# Patient Record
Sex: MALE | URBAN - METROPOLITAN AREA
[De-identification: names, ages, dates, MRNs, and addresses within clinical notes are randomized per-mention and may not be internally consistent; named-entity substitution may affect disease eponyms.]

---

## 2017-05-03 ENCOUNTER — TELEPHONE (OUTPATIENT)
Dept: DERMATOLOGY | Facility: CLINIC | Age: 27
End: 2017-05-03

## 2017-05-03 NOTE — TELEPHONE ENCOUNTER
I called Miller to try and reschedule his t-cell apointment with  Dr. Bernard to Dr. Olivera for 6/26/17 @ 8:00 but he did not answer. I left a VM to call us back.

## 2017-05-03 NOTE — TELEPHONE ENCOUNTER
----- Message from Herminia DURANT Garrison sent at 5/1/2017  6:41 PM CDT -----  Regarding: FW: T- Cell Clinic Questions      ----- Message -----     From: Chioma Knight     Sent: 5/1/2017   5:30 PM       To: Derm Triage-[facility]  Subject: T- Cell Clinic Questions                         Hey you amber,    I just got off the phone with this patient who is wanting to come in for cutaneous t cell lymphoma. Per our guide if Dr. Joselin Bhat is booking out past 30 days to schedule in general derm and just make a note of that in the appointment notes. I informed the patient of this, but he is concerned that he is not seeing a specialist as he currently sees a general dermatology doctor. I told him that I could let you guys know he is concerned- do you have any talking points for me? Or if the patient is wanting to wait until the end of July for Joselin Bhat- are they OK with that, or would they rather the patient just get in to the clinic?    Thanks!  Chioma  [facility] Referrals

## 2017-06-30 ENCOUNTER — TRANSFERRED RECORDS (OUTPATIENT)
Dept: HEALTH INFORMATION MANAGEMENT | Facility: CLINIC | Age: 27
End: 2017-06-30

## 2017-07-10 ENCOUNTER — OFFICE VISIT (OUTPATIENT)
Dept: DERMATOLOGY | Facility: CLINIC | Age: 27
End: 2017-07-10

## 2017-07-10 VITALS — DIASTOLIC BLOOD PRESSURE: 77 MMHG | HEART RATE: 80 BPM | SYSTOLIC BLOOD PRESSURE: 134 MMHG

## 2017-07-10 DIAGNOSIS — C84.A0 CUTANEOUS T-CELL LYMPHOMA, UNSPECIFIED BODY REGION (H): ICD-10-CM

## 2017-07-10 DIAGNOSIS — C84.A0 CUTANEOUS T-CELL LYMPHOMA, UNSPECIFIED BODY REGION (H): Primary | ICD-10-CM

## 2017-07-10 LAB
BASOPHILS # BLD AUTO: 0 10E9/L (ref 0–0.2)
BASOPHILS NFR BLD AUTO: 0.5 %
DIFFERENTIAL METHOD BLD: NORMAL
EOSINOPHIL # BLD AUTO: 0.3 10E9/L (ref 0–0.7)
EOSINOPHIL NFR BLD AUTO: 5 %
ERYTHROCYTE [DISTWIDTH] IN BLOOD BY AUTOMATED COUNT: 12 % (ref 10–15)
HCT VFR BLD AUTO: 42.2 % (ref 40–53)
HGB BLD-MCNC: 14.2 G/DL (ref 13.3–17.7)
IMM GRANULOCYTES # BLD: 0 10E9/L (ref 0–0.4)
IMM GRANULOCYTES NFR BLD: 0.2 %
LYMPHOCYTES # BLD AUTO: 1.6 10E9/L (ref 0.8–5.3)
LYMPHOCYTES NFR BLD AUTO: 29.2 %
MCH RBC QN AUTO: 30.9 PG (ref 26.5–33)
MCHC RBC AUTO-ENTMCNC: 33.6 G/DL (ref 31.5–36.5)
MCV RBC AUTO: 92 FL (ref 78–100)
MONOCYTES # BLD AUTO: 0.4 10E9/L (ref 0–1.3)
MONOCYTES NFR BLD AUTO: 6.8 %
NEUTROPHILS # BLD AUTO: 3.3 10E9/L (ref 1.6–8.3)
NEUTROPHILS NFR BLD AUTO: 58.3 %
NRBC # BLD AUTO: 0 10*3/UL
NRBC BLD AUTO-RTO: 0 /100
PLATELET # BLD AUTO: 251 10E9/L (ref 150–450)
RBC # BLD AUTO: 4.59 10E12/L (ref 4.4–5.9)
RETICS # AUTO: 56.5 10E9/L (ref 25–95)
RETICS/RBC NFR AUTO: 1.2 % (ref 0.5–2)
WBC # BLD AUTO: 5.6 10E9/L (ref 4–11)

## 2017-07-10 RX ORDER — DESOXIMETASONE 2.5 MG/G
CREAM TOPICAL
COMMUNITY
Start: 2015-07-07

## 2017-07-10 RX ORDER — CLOBETASOL PROPIONATE 0.5 MG/ML
SOLUTION TOPICAL
COMMUNITY
Start: 2016-08-12

## 2017-07-10 ASSESSMENT — PAIN SCALES - GENERAL
PAINLEVEL: NO PAIN (0)
PAINLEVEL: NO PAIN (0)

## 2017-07-10 NOTE — PATIENT INSTRUCTIONS

## 2017-07-10 NOTE — LETTER
7/10/2017       RE: Miller Maldonado  [address] [place] [id number]     Dear Colleague,    Thank you for referring your patient, Miller Maldonado, to the [facility] DERMATOLOGY at [facility]. Please see a copy of my visit note below.    [facility] Dermatology Note      Dermatology Problem List:  1. Cutaneous T-cell lymphoma, folliculotropic mycoses fungoides: Patient has been seen by Dr. Dm Santamaria with [facility] and has been undergoing NBUVB Light treatment and Excimer Breezy treatment since November 2015. Has had >231 treatments. Patient has also used betamethasone 0.05% ointment to the face and clobetasol 0.05% solution to non-face affected  areas.     Encounter Date: Jul 10, 2017    CC:   Chief Complaint   Patient presents with     Derm Problem     Miller comes to clinic today for CTCL.         History of Present Illness:  Mr. Miller Maldonado is a 27 year old male with PMH of eczema since age 13 and Cutaneous T-Cell Lymphoma, Folliculotropic Mycoses Fungoides who has been treated with NBUVB light treatment and Excimer Laser treatment since his diagnosis in November 2015. Patient has been followed by Dr. Dm Santamaria with the [facility]. He states that he has not really seen much change in his CTCL since starting his treatments and presents as a new patient to Dermatology clinic today basically for another opinion. Patient reports that he first noticed his skin changes back in October/November 2015. These started as hardened, red areas of skin. They did not itch, burn, or otherwise cause pain and he states that the lesions themselves are only symptomatic after he has received light therapy. He has been receiving Excimer Light treatment on lesions on his right cheek, right eyebrow, nose, chin, and right neck. Patient denies any fevers, chills, fatigue, recent unexplained weight loss.       Past Medical History:   There is no problem list on  file for this patient.    History reviewed. No pertinent past medical history.  History reviewed. No pertinent surgical history.    Social History:  The patient works as a . He is  and lives in [place], [place].      Family History:  Mother has history of melanoma.     Medications:  Current Outpatient Prescriptions   Medication Sig Dispense Refill     clobetasol (TEMOVATE) 0.05 % external solution        desoximetasone (TOPICORT) 0.25 % cream           No Known Allergies      Review of Systems:  -Constitutional: The patient denies fatigue, fevers, chills, unintended weight loss, and night sweats.  -HEENT: Patient denies nonhealing oral sores.  -Skin: As above in HPI. No additional skin concerns.    Physical exam:  Vitals: /77  Pulse 80  GEN: This is a well developed, well-nourished male in no acute distress, in a pleasant mood.  He presents to clinic with his wife.  SKIN: Total skin excluding the undergarment areas was performed. The exam included the head/face, neck, both arms, chest, back, abdomen, both legs, digits and/or nails.   -Several erythematous, indurated plaques located on chest, back, arms, legs.  -Dry, flaky skin with several small erosions located on back.   -No other lesions of concern on areas examined.   - No palpable nodes    Impression/Plan:  1. Cutaneous T-Cell Lymphoma: stable with NBUVB and Excimer Laser treatments 3 x weekly. Last lab work was done in November 2015.     Patient to continue with current treatments with Dr. Santamaria at [facility] in [place], [place]    Will re-stage patient today with following labs:    CBC with diff    Peripheral smear    Lymphoma Leukemia profile    TCR rearrangements of blood and tissue    Punch biopsy x 3, 2 x formalin (left mid forearm and left upper back over scapula), 1 x tissue TCR rearrangment (left mid forearm)  Punch biopsy:  After discussion of benefits and risks including but not limited to bleeding/bruising, pain/swelling,  infection, scar, incomplete removal, nerve damage/numbness, recurrence, and non-diagnostic biopsy, written consent, verbal consent and photographs were obtained. Time-out was performed. The area was cleaned with isopropyl alcohol. 2 mL of 1% lidocaine with epinephrine was injected to obtain adequate anesthesia of the lesion on the left mid forearm and left upper back over scapula. A 4 mm punch biopsy was performed.  4-0 prolene sutures were utilized to approximate the epidermal edges.  White petroleum jelly/VaselineTM and a bandage was applied to the wound.  Explicit verbal and written wound care instructions were provided.  The patient left the Dermatology Clinic in good condition. The patient was counseled to have his sutures removed in 14 days at his Dermatology office in [place], [place].       CC Dr. Arizmendi on close of this encounter.  Follow-up in 1 months, earlier for new or changing lesions.       Dr. Bhat staffed the patient.    Staff Involved:  Resident(Jose Matthews)/Staff(as above)    Jose Matthews MD, PhD  Medicine-Dermatology PGY-2    Patient was seen and examined with the medicine/dermatology resident. I agree with the history, review of systems, physical examination, assessments and plan. I was present for the key portions of the biopsy procedures and participated with these as well.     Tasneem Bhat MD  Professor and  Chair  Department of Dermatology  [facility]    Pictures were placed in Pt's chart today for future reference.            Again, thank you for allowing me to participate in the care of your patient.      Sincerely,    Tasneem Bhat MD

## 2017-07-10 NOTE — NURSING NOTE
Dermatology Rooming Note    Miller Maldonado's goals for this visit include:   Chief Complaint   Patient presents with     Derm Problem     Miller comes to clinic today for CTCL.     Nancy Rutledge, CMA

## 2017-07-10 NOTE — MR AVS SNAPSHOT
After Visit Summary   7/10/2017    Miller Maldonado    MRN: [id number]           Patient Information     Date Of Birth          1990        Visit Information        Provider Department      7/10/2017 3:10 PM Tasneem Bhat MD [facility] Dermatology        Today's Diagnoses     Cutaneous T-cell lymphoma, unspecified body region (H)    -  1      Care Instructions    Wound Care After a Biopsy    What is a skin biopsy?  A skin biopsy allows the doctor to examine a very small piece of tissue under the microscope to determine the diagnosis and the best treatment for the skin condition. A local anesthetic (numbing medicine)  is injected with a very small needle into the skin area to be tested. A small piece of skin is taken from the area. Sometimes a suture (stitch) is used.     What are the risks of a skin biopsy?  I will experience scar, bleeding, swelling, pain, crusting and redness. I may experience incomplete removal or recurrence. Risks of this procedure are excessive bleeding, bruising, infection, nerve damage, numbness, thick (hypertrophic or keloidal) scar and non-diagnostic biopsy.    How should I care for my wound for the first 24 hours?    Keep the wound dry and covered for 24 hours    If it bleeds, hold direct pressure on the area for 15 minutes. If bleeding does not stop then go to the emergency room    Avoid strenuous exercise the first 1-2 days or as your doctor instructs you    How should I care for the wound after 24 hours?    After 24 hours, remove the bandage    You may bathe or shower as normal    If you had a scalp biopsy, you can shampoo as usual and can use shower water to clean the biopsy site daily    Clean the wound twice a day with gentle soap and water    Do not scrub, be gentle    Apply white petroleum/Vaseline after cleaning the wound with a cotton swab or a clean finger, and keep the site covered with a Bandaid /bandage. Bandages are not necessary with a scalp  biopsy    If you are unable to cover the site with a Bandaid /bandage, re-apply ointment 2-3 times a day to keep the site moist. Moisture will help with healing    Avoid strenuous activity for first 1-2 days    Avoid lakes, rivers, pools, and oceans until the stitches are removed or the site is healed    How do I clean my wound?    Wash hands thoroughly with soap or use hand  before all wound care    Clean the wound with gentle soap and water    Apply white petroleum/Vaseline  to wound after it is clean    Replace the Bandaid /bandage to keep the wound covered for the first few days or as instructed by your doctor    If you had a scalp biopsy, warm shower water to the area on a daily basis should suffice    What should I use to clean my wound?     Cotton-tipped applicators (Qtips )    White petroleum jelly (Vaseline ). Use a clean new container and use Q-tips to apply.    Bandaids   as needed    Gentle soap     How should I care for my wound long term?    Do not get your wound dirty    Keep up with wound care for one week or until the area is healed.    A small scab will form and fall off by itself when the area is completely healed. The area will be red and will become pink in color as it heals. Sun protection is very important for how your scar will turn out. Sunscreen with an SPF 30 or greater is recommended once the area is healed.    If you have stitches, stitches need to be removed in 14 days. You may return to our clinic for this or you may have it done locally at your doctor s office.    You should have some soreness but it should be mild and slowly go away over several days. Talk to your doctor about using tylenol for pain,    When should I call my doctor?  If you have increased:     Pain or swelling    Pus or drainage (clear or slightly yellow drainage is ok)    Temperature over 100F    Spreading redness or warmth around wound    When will I hear about my results?  The biopsy results can take 2-3  weeks to come back. The clinic will call you with the results, send you a [employer] message, or have you schedule a follow-up clinic or phone time to discuss the results. Contact our clinics if you do not hear from us in 3 weeks.     Who should I call with questions?    [facility]: [phone]     [facility]: [phone]    For urgent needs outside of business hours call the [facility] at [phone] and ask for the dermatology resident on call            Follow-ups after your visit        Follow-up notes from your care team     Return in about 5 years (around 7/10/2022).      Your next 10 appointments already scheduled     Jul 10, 2017  6:00 PM CDT   LAB with  LAB   [facility] ([facility])    [address] [place] [id number] [phone]           Patient must bring picture ID.  Patient should be prepared to give a urine specimen  Please do not eat 10-12 hours before your appointment if you are coming in fasting for labs on lipids, cholesterol, or glucose (sugar).  Pregnant women should follow their Care Team instructions. Water with medications is okay. Do not drink coffee or other fluids.   If you have concerns about taking  your medications, please ask at office or if scheduling via [employer], send a message by clicking on Secure Messaging, Message Your Care Team.            Aug 28, 2017 10:15 AM CDT   (Arrive by 10:00 AM)   Return T-Cell Visit with Tasneem Bhat MD   [facility]ermat[facility] ([facility])    [address] [place] [id number] [phone]              Future tests that were ordered for you today     Open Future Orders        Priority Expected Expires Ordered    T cell receptor gene rearrangemt PCR - Blood Routine 7/10/2017 7/17/2017 7/10/2017    CBC with platelets Routine  7/10/2018 7/10/2017    Bld morphology  pathology review Routine  7/11/2018 7/10/2017            Who to contact     Please call your clinic at [phone] to:    Ask questions about your health    Make or cancel appointments    Discuss your medicines    Learn about your test results    Speak to your doctor   If you have compliments or concerns about an experience at your clinic, or if you wish to file a complaint, please contact [facility] Patient Relations at [phone] or email us at [email or web address]         Additional Information About Your Visit        [place] Information     [employer] gives you secure access to your electronic health record. If you see a primary care provider, you can also send messages to your care team and make appointments. If you have questions, please call your primary care clinic.  If you do not have a primary care provider, please call [phone] and they will assist you.      [employer] is an electronic gateway that provides easy, online access to your medical records. With [employer], you can request a clinic appointment, read your test results, renew a prescription or communicate with your care team.     To access your existing account, please contact your [facility] or call [phone] for assistance.        Care EveryWhere ID     This is your Care EveryWhere ID. This could be used by other organizations to access your [facility] medical records  [id number]        Your Vitals Were     Pulse                   80            Blood Pressure from Last 3 Encounters:   07/10/17 134/77    Weight from Last 3 Encounters:   No data found for Wt              We Performed the Following     BIOPSY SKIN/SUBQ/MUC MEM, EACH ADDTL LESION     BIOPSY SKIN/SUBQ/MUC MEM, SINGLE LESION     Leukemia Lymphoma Evaluation (Flow Cytometry)     Leukemia Lymphoma Evaluation (Flow Cytometry)     Surgical pathology exam     T cell receptor gene rearrangemt PCR -Fresh Tissue         Primary Care Provider Office Phone # Fax #    Luan Arizmendi [phone]       [address] [address]        Equal Access to Services     EARL HUTCHINSON : Hadii aad ku hadluco Sotimothyali, waaxda luqadaha, qaybta kaalmada adepedro pablo, lia ordazkelvin paradanura kenyon sandy harding. So [facility] [phone].    ATENCIÓN: Si habla español, tiene a weems disposición servicios gratuitos de asistencia lingüística. Llame al [phone].    We comply with applicable federal civil rights laws and [place] laws. We do not discriminate on the basis of race, color, national origin, age, disability sex, sexual orientation or gender identity.            Thank you!     Thank you for choosing [facility] DERMATOLOGY  for your care. Our goal is always to provide you with excellent care. Hearing back from our patients is one way we can continue to improve our services. Please take a few minutes to complete the written survey that you may receive in the mail after your visit with us. Thank you!             Your Updated Medication List - Protect others around you: Learn how to safely use, store and throw away your medicines at www.disposemymeds.org.          This list is accurate as of: 7/10/17  5:49 PM.  Always use your most recent med list.                   Brand Name Dispense Instructions for use Diagnosis    clobetasol 0.05 % external solution    TEMOVATE          desoximetasone 0.25 % cream    TOPICORT

## 2017-07-10 NOTE — PROGRESS NOTES
[facility] Dermatology Note      Dermatology Problem List:  1. Cutaneous T-cell lymphoma, folliculotropic mycoses fungoides: Patient has been seen by Dr. Dm Santamaria with [facility] and has been undergoing NBUVB Light treatment and Excimer Breezy treatment since November 2015. Has had >231 treatments. Patient has also used betamethasone 0.05% ointment to the face and clobetasol 0.05% solution to non-face affected  areas.     Encounter Date: Jul 10, 2017    CC:   Chief Complaint   Patient presents with     Derm Problem     Miller comes to clinic today for CTCL.         History of Present Illness:  Mr. Miller Maldonado is a 27 year old male with PMH of eczema since age 13 and Cutaneous T-Cell Lymphoma, Folliculotropic Mycoses Fungoides who has been treated with NBUVB light treatment and Excimer Laser treatment since his diagnosis in November 2015. Patient has been followed by Dr. Dm Santamaria with the [facility]. He states that he has not really seen much change in his CTCL since starting his treatments and presents as a new patient to Dermatology clinic today basically for another opinion. Patient reports that he first noticed his skin changes back in October/November 2015. These started as hardened, red areas of skin. They did not itch, burn, or otherwise cause pain and he states that the lesions themselves are only symptomatic after he has received light therapy. He has been receiving Excimer Light treatment on lesions on his right cheek, right eyebrow, nose, chin, and right neck. Patient denies any fevers, chills, fatigue, recent unexplained weight loss.       Past Medical History:   There is no problem list on file for this patient.    History reviewed. No pertinent past medical history.  History reviewed. No pertinent surgical history.    Social History:  The patient works as a . He is  and lives in [place], [place].      Family History:  Mother has history of  melanoma.     Medications:  Current Outpatient Prescriptions   Medication Sig Dispense Refill     clobetasol (TEMOVATE) 0.05 % external solution        desoximetasone (TOPICORT) 0.25 % cream           No Known Allergies      Review of Systems:  -Constitutional: The patient denies fatigue, fevers, chills, unintended weight loss, and night sweats.  -HEENT: Patient denies nonhealing oral sores.  -Skin: As above in HPI. No additional skin concerns.    Physical exam:  Vitals: /77  Pulse 80  GEN: This is a well developed, well-nourished male in no acute distress, in a pleasant mood.  He presents to clinic with his wife.  SKIN: Total skin excluding the undergarment areas was performed. The exam included the head/face, neck, both arms, chest, back, abdomen, both legs, digits and/or nails.   -Several erythematous, indurated plaques located on chest, back, arms, legs.  -Dry, flaky skin with several small erosions located on back.   -No other lesions of concern on areas examined.   - No palpable nodes    Impression/Plan:  1. Cutaneous T-Cell Lymphoma: stable with NBUVB and Excimer Laser treatments 3 x weekly. Last lab work was done in November 2015.     Patient to continue with current treatments with Dr. Santamaria at [facility] in [place], [place]    Will re-stage patient today with following labs:    CBC with diff    Peripheral smear    Lymphoma Leukemia profile    TCR rearrangements of blood and tissue    Punch biopsy x 3, 2 x formalin (left mid forearm and left upper back over scapula), 1 x tissue TCR rearrangment (left mid forearm)  Punch biopsy:  After discussion of benefits and risks including but not limited to bleeding/bruising, pain/swelling, infection, scar, incomplete removal, nerve damage/numbness, recurrence, and non-diagnostic biopsy, written consent, verbal consent and photographs were obtained. Time-out was performed. The area was cleaned with isopropyl alcohol. 2 mL of 1% lidocaine with epinephrine was  injected to obtain adequate anesthesia of the lesion on the left mid forearm and left upper back over scapula. A 4 mm punch biopsy was performed.  4-0 prolene sutures were utilized to approximate the epidermal edges.  White petroleum jelly/VaselineTM and a bandage was applied to the wound.  Explicit verbal and written wound care instructions were provided.  The patient left the Dermatology Clinic in good condition. The patient was counseled to have his sutures removed in 14 days at his Dermatology office in [place], [place].       CC Dr. Arizmendi on close of this encounter.  Follow-up in 1 months, earlier for new or changing lesions.       Dr. Bhat staffed the patient.    Staff Involved:  Resident(Jose Matthews)/Staff(as above)    Jose Matthews MD, PhD  Medicine-Dermatology PGY-2    Patient was seen and examined with the medicine/dermatology resident. I agree with the history, review of systems, physical examination, assessments and plan. I was present for the key portions of the biopsy procedures and participated with these as well.     Tasneem Bhat MD  Professor and  Chair  Department of Dermatology  [facility]

## 2017-07-11 LAB — COPATH REPORT: NORMAL

## 2017-07-12 LAB
COPATH REPORT: NORMAL
COPATH REPORT: NORMAL

## 2017-07-14 LAB — COPATH REPORT: NORMAL

## 2017-07-22 LAB — COPATH REPORT: NORMAL

## 2017-07-23 PROBLEM — C84.A0 CUTANEOUS T-CELL LYMPHOMA, UNSPECIFIED BODY REGION (H): Status: ACTIVE | Noted: 2017-07-23

## 2017-08-28 ENCOUNTER — OFFICE VISIT (OUTPATIENT)
Dept: DERMATOLOGY | Facility: CLINIC | Age: 27
End: 2017-08-28

## 2017-08-28 DIAGNOSIS — C84.A0 CUTANEOUS T-CELL LYMPHOMA, UNSPECIFIED BODY REGION (H): ICD-10-CM

## 2017-08-28 DIAGNOSIS — L21.9 DERMATITIS, SEBORRHEIC: Primary | ICD-10-CM

## 2017-08-28 RX ORDER — CLOBETASOL PROPIONATE 0.05 G/100ML
SHAMPOO TOPICAL
Qty: 118 ML | Refills: 3 | Status: SHIPPED | OUTPATIENT
Start: 2017-08-28

## 2017-08-28 RX ORDER — CLOBETASOL PROPIONATE 0.05 G/100ML
SHAMPOO TOPICAL
Qty: 118 ML | Refills: 3 | Status: SHIPPED | OUTPATIENT
Start: 2017-08-28 | End: 2017-08-28

## 2017-08-28 ASSESSMENT — PAIN SCALES - GENERAL: PAINLEVEL: NO PAIN (0)

## 2017-08-28 NOTE — MR AVS SNAPSHOT
After Visit Summary   8/28/2017    Miller Maldonado    MRN: [id number]           Patient Information     Date Of Birth          1990        Visit Information        Provider Department      8/28/2017 10:15 AM Tasneem Bhat MD 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 However, the folliculotropic variant is deeper and can be harder to treat.  Therefore, we consider starting bexarotene (Targretin) cream. This is expensive, but often we are able to get this covered by your insurance provider.  Bexarotene should be started every other day to minimize drying/irritation early on, then gradually increasing to every other day and up to daily as tolerated.  The bexarotene works well in conjunction with the light treatments as well, as the two work synergistically.  In the meantime, continue using your topical steroid regimen.  In the future, we could also consider obtaining a home light unit to ease the burden of your frequent visits for treatments.          Follow-ups after your visit        Follow-up notes from your care team     Return in about 4 months (around 12/28/2017) for Routine Visit.      Who to contact     Please call your clinic at [phone] to:    Ask questions about your health    Make or cancel appointments    Discuss your medicines    Learn about your test results    Speak to your doctor   If you have compliments or concerns about an experience at your clinic, or if you wish to file a complaint, please contact [facility] Patient Relations at [phone] or email us at [email or web address]         Additional Information About Your Visit        MyChart Information      [employer] gives you secure access to your electronic health record. If you see a primary care provider, you can also send messages to your care team and make appointments. If you have questions, please call your primary care clinic.  If you do not have a primary care provider, please call [phone] and they will assist you.      [employer] is an electronic gateway that provides easy, online access to your medical records. With [employer], you can request a clinic appointment, read your test results, renew a prescription or communicate with your care team.     To access your existing account, please contact your [facility] or call [phone] for assistance.        Care EveryWhere ID     This is your Care EveryWhere ID. This could be used by other organizations to access your [facility] medical records  [id number]         Blood Pressure from Last 3 Encounters:   07/10/17 134/77    Weight from Last 3 Encounters:   No data found for Wt              Today, you had the following     No orders found for display         Today's Medication Changes          These changes are accurate as of: 8/28/17 11:28 AM.  If you have any questions, ask your nurse or doctor.               These medicines have changed or have updated prescriptions.        Dose/Directions    * clobetasol 0.05 % external solution   Commonly known as:  TEMOVATE   This may have changed:  Another medication with the same name was added. Make sure you understand how and when to take each.   Changed by:  Tasneem Bhat MD        Refills:  0       * clobetasol propionate 0.05 % Sham   This may have changed:  You were already taking a medication with the same name, and this prescription was added. Make sure you understand how and when to take each.   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   Quantity:  118 mL   Refills:  3       * Notice:  This list has 2 medication(s) that are the same as other medications prescribed for you. Read the directions carefully, and ask your doctor or other care provider to review them with you.         Where to get your medicines      These medications were sent to [facility] - [place], [place] - [address]  [address] [address]     Phone:  [phone]     clobetasol propionate 0.05 % Sham                Primary Care Provider Office Phone # Fax #    Luan Arizmendi [phone]       [facility] [address] [id number]        Equal Access to Services     EARL HUTCHINSON : Alan Wilson, waaxda luqadaha, qaybta kaalmada adenurayaguilherme, lia harding. So [facility] [phone].    ATENCIÓN: Si habla español, tiene a weems disposición servicios gratuitos de asistencia lingüística. Llame al [phone].    We comply with applicable federal civil rights laws and [place] laws. We do not discriminate on the basis of race, color, national origin, age, disability sex, sexual orientation or gender identity.            Thank you!     Thank you for choosing [facility] DERMATOLOGY  for your care. Our goal is always to provide you with excellent care. Hearing back from our patients is one way we can continue to improve our services. Please take a few minutes to complete the written survey that you may receive in the mail after your visit with us. Thank you!             Your Updated Medication List - Protect others around you: Learn how to safely use, store and throw away your medicines at www.disposemymeds.org.          This list is accurate as of: 8/28/17 11:28 AM.  Always use your most recent med list.                   Brand Name Dispense Instructions for use Diagnosis    * clobetasol 0.05 % external solution    TEMOVATE           * clobetasol propionate 0.05 % Sham     118 mL    Massage into a dry scalp daily, leave in for ~30 minutes then rinse in the shower.    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 Read the directions carefully, and ask your doctor or other care provider to review them with you.

## 2017-08-28 NOTE — NURSING NOTE
Dermatology Rooming Note    Miller Maldonado's goals for this visit include:   Chief Complaint   Patient presents with     Derm Problem     Raad here today for a t-cell follow up.      NABIL Gonzalez

## 2017-08-28 NOTE — PROGRESS NOTES
[facility] Dermatology Note      Dermatology Problem List:  1. Cutaneous T-cell lymphoma, folliculotropic mycoses fungoides: Last staging 7/20/17 confirmed folliculotropic MF with no systemic involvement on flow cytometry. Follows with Dr. Dm Santamaria with [facility] for nbUVB and Excimer Laser.   - Current tx: nbUVB/excimer laser at [place], desoximetasone ointment daily, clobetasol shampoo for scalp  - Future tx: Discussed topical bexarotene, will think about this and readdress at f/u    Encounter Date: Aug 28, 2017    CC:   Chief Complaint   Patient presents with     Derm Problem     Raad here today for a t-cell follow up.          History of Present Illness:  Mr. Miller Maldonado is a 27 year old male who presents for follow up of folliculotropic MF. The patient was last seen for this 7/10/17, at which time he was re-staged with repeat biopsy and flow cytometry. This confirmed folliculotropic MF, and there was no evidence of blood involvement on flow. Since last visit, the patient has continued light treatments. He has topical steroids available as well, but has only used these on areas he suspects are eczema rather than his MF. Overall, he feels like things have been going well and not particularly worse, but he is interested in additional treatment options in hopes of achieving greater control of his skin. He does not report fevers, chills, fatigue, recent unexplained weight loss. The patient does not describe any other new, painful, bleeding, or non-healing skin lesions.    Past Medical History:   Patient Active Problem List   Diagnosis     Cutaneous T-cell lymphoma, unspecified body region (H)     History reviewed. No pertinent past medical history.  History reviewed. No pertinent surgical history.    Social History:  The patient works as a . He is  and lives in [place], [place].      Family History:  Mother has history of melanoma.     Medications:  Current Outpatient  Prescriptions   Medication Sig Dispense Refill     clobetasol (TEMOVATE) 0.05 % external solution        desoximetasone (TOPICORT) 0.25 % cream           No Known Allergies      Review of Systems:  -Constitutional: The patient denies fatigue, fevers, chills, unintended weight loss, and night sweats.  -HEENT: Patient denies nonhealing oral sores.  -Skin: As above in HPI. No additional skin concerns.    Physical exam:  GEN: This is a well developed, well-nourished male in no acute distress, in a pleasant mood.  He presents to clinic with his wife.  SKIN: Total skin excluding the undergarment areas was performed. The exam included the head/face, neck, both arms, chest, back, abdomen, both legs, digits and/or nails.   - Multiple light pink patches and indurated plaques scattered over the chest, abdomen, back, and bilateral upper/lower extremities. These are variably scaly. Approximately 15% TBSA affected  - Multiple light brown hyperpigmented patches at previously affected areas on the trunk and extremities  - Scalp with diffuse light pink erythema and light scale  - No other lesions of concern on areas examined.   Lymph: No submandibular, cervical or axillary lymphadenopathy    Impression/Plan:  1. Cutaneous T-Cell Lymphoma, stage Ib: The patient was recently re-staged and fortunately has skin limited disease at this time. However, given that folliculotropic may carry a worse prognosis we elected to step up therapy. Options were reviewed including topical and oral bexarotene. After discussion, we advised a trial of topical bexarotene. However, the patient preferred to hold off on treatment for now and wanted to think about this. As such, he will continue his current regimen for the time being as below. The patient also had a fair amount of scalp erythema and scaling, which may represent either seborrhea or his underlying CTCL. He was provided Clobex shampoo for this.    Patient to continue with nbUVB and excimer with  Dr. Santamaria at [facility] in [facility], [place]    Continue desoximetasone ointment, may use daily to patches/plaques of MF    Begin Clobex shampoo to the scalp    Readdress bexarotene topically at f/u        CC Dr. Arizmendi and Dr. Santamaria on close of this encounter.  Follow-up in 1 months, earlier for new or changing lesions.       Dr. Bhat staffed the patient.    Staff Involved:  Resident(Henri Parrish)/Staff(as above)    Henri Anderson MD  Dermatology resident, PGY-4  [phone]     Patient was seen and examined with the dermatology resident. I agree with the history, review of systems, physical examination, assessments and plan. Over 50% of this 20-25 minute visit was spent in consultation and education.    Tasneem Bhat MD  Professor and  Chair  Department of Dermatology  [facility]

## 2017-08-28 NOTE — PATIENT INSTRUCTIONS
We are happy that the testing at last visit confirmed the lymphoma is only present in the skin and not elsewhere in the body. However, the folliculotropic variant is deeper and can be harder to treat.  Therefore, we consider starting bexarotene (Targretin) cream. This is expensive, but often we are able to get this covered by your insurance provider.  Bexarotene should be started every other day to minimize drying/irritation early on, then gradually increasing to every other day and up to daily as tolerated.  The bexarotene works well in conjunction with the light treatments as well, as the two work synergistically.  In the meantime, continue using your topical steroid regimen.  In the future, we could also consider obtaining a home light unit to ease the burden of your frequent visits for treatments.

## 2017-08-28 NOTE — LETTER
8/28/2017       RE: Miller Maldonado  [address] [place] [id number]     Dear Colleague,    Thank you for referring your patient, Miller Maldonado, to the [facility] DERMATOLOGY at [facility]. Please see a copy of my visit note below.    [facility] Dermatology Note      Dermatology Problem List:  1. Cutaneous T-cell lymphoma, folliculotropic mycoses fungoides: Last staging 7/20/17 confirmed folliculotropic MF with no systemic involvement on flow cytometry. Follows with Dr. Dm Santamaria with [facility] for nbUVB and Excimer Laser.   - Current tx: nbUVB/excimer laser at [place], desoximetasone ointment daily, clobetasol shampoo for scalp  - Future tx: Discussed topical bexarotene, will think about this and readdress at f/u    Encounter Date: Aug 28, 2017    CC:   Chief Complaint   Patient presents with     Derm Problem     Raad here today for a t-cell follow up.          History of Present Illness:  Mr. Miller Maldonado is a 27 year old male who presents for follow up of folliculotropic MF. The patient was last seen for this 7/10/17, at which time he was re-staged with repeat biopsy and flow cytometry. This confirmed folliculotropic MF, and there was no evidence of blood involvement on flow. Since last visit, the patient has continued light treatments. He has topical steroids available as well, but has only used these on areas he suspects are eczema rather than his MF. Overall, he feels like things have been going well and not particularly worse, but he is interested in additional treatment options in hopes of achieving greater control of his skin. He does not report fevers, chills, fatigue, recent unexplained weight loss. The patient does not describe any other new, painful, bleeding, or non-healing skin lesions.    Past Medical History:   Patient Active Problem List   Diagnosis     Cutaneous T-cell lymphoma, unspecified body region (H)     History reviewed.  No pertinent past medical history.  History reviewed. No pertinent surgical history.    Social History:  The patient works as a . He is  and lives in [place], [place].      Family History:  Mother has history of melanoma.     Medications:  Current Outpatient Prescriptions   Medication Sig Dispense Refill     clobetasol (TEMOVATE) 0.05 % external solution        desoximetasone (TOPICORT) 0.25 % cream           No Known Allergies      Review of Systems:  -Constitutional: The patient denies fatigue, fevers, chills, unintended weight loss, and night sweats.  -HEENT: Patient denies nonhealing oral sores.  -Skin: As above in HPI. No additional skin concerns.    Physical exam:  GEN: This is a well developed, well-nourished male in no acute distress, in a pleasant mood.  He presents to clinic with his wife.  SKIN: Total skin excluding the undergarment areas was performed. The exam included the head/face, neck, both arms, chest, back, abdomen, both legs, digits and/or nails.   - Multiple light pink patches and indurated plaques scattered over the chest, abdomen, back, and bilateral upper/lower extremities. These are variably scaly. Approximately 15% TBSA affected  - Multiple light brown hyperpigmented patches at previously affected areas on the trunk and extremities  - Scalp with diffuse light pink erythema and light scale  - No other lesions of concern on areas examined.   Lymph: No submandibular, cervical or axillary lymphadenopathy    Impression/Plan:  1. Cutaneous T-Cell Lymphoma, stage Ib: The patient was recently re-staged and fortunately has skin limited disease at this time. However, given that folliculotropic may carry a worse prognosis we elected to step up therapy. Options were reviewed including topical and oral bexarotene. After discussion, we advised a trial of topical bexarotene. However, the patient preferred to hold off on treatment for now and wanted to think about this. As such, he will  continue his current regimen for the time being as below. The patient also had a fair amount of scalp erythema and scaling, which may represent either seborrhea or his underlying CTCL. He was provided Clobex shampoo for this.    Patient to continue with nbUVB and excimer with Dr. Santamaria at [facility] in [place], [place]    Continue desoximetasone ointment, may use daily to patches/plaques of MF    Begin Clobex shampoo to the scalp    Readdress bexarotene topically at f/u        CC Dr. Arizmendi and Dr. Santamaria on close of this encounter.  Follow-up in 1 months, earlier for new or changing lesions.       Dr. Bhat staffed the patient.    Staff Involved:  Resident(Henri Parrish)/Staff(as above)    Henri Anderson MD  Dermatology resident, PGY-4  [phone]     Patient was seen and examined with the dermatology resident. I agree with the history, review of systems, physical examination, assessments and plan. Over 50% of this 20-25 minute visit was spent in consultation and education.    Tasneem Bhat MD  Professor and  Chair  Department of Dermatology  [facility]    Pictures were placed in Pt's chart today for future reference.              Again, thank you for allowing me to participate in the care of your patient.      Sincerely,    Tasneem Bhat MD

## 2020-03-10 ENCOUNTER — HEALTH MAINTENANCE LETTER (OUTPATIENT)
Age: 30
End: 2020-03-10

## 2020-12-27 ENCOUNTER — HEALTH MAINTENANCE LETTER (OUTPATIENT)
Age: 30
End: 2020-12-27

## 2021-04-24 ENCOUNTER — HEALTH MAINTENANCE LETTER (OUTPATIENT)
Age: 31
End: 2021-04-24

## 2021-10-09 ENCOUNTER — HEALTH MAINTENANCE LETTER (OUTPATIENT)
Age: 31
End: 2021-10-09

## 2022-05-21 ENCOUNTER — HEALTH MAINTENANCE LETTER (OUTPATIENT)
Age: 32
End: 2022-05-21

## 2022-09-11 ENCOUNTER — HEALTH MAINTENANCE LETTER (OUTPATIENT)
Age: 32
End: 2022-09-11

## 2023-06-03 ENCOUNTER — HEALTH MAINTENANCE LETTER (OUTPATIENT)
Age: 33
End: 2023-06-03